# Patient Record
(demographics unavailable — no encounter records)

---

## 2024-11-22 NOTE — ASSESSMENT
[FreeTextEntry1] : Discussed PSA results with patient- stable. Repeat PSA in 7 months. Next follow-up appointment in 7 months. All questions answered patient agreeable with plan. 20-minute discussion for prostate cancer follow-up and review of labs.

## 2024-11-22 NOTE — ADDENDUM
[FreeTextEntry1] : I, Rosa Carrillo assisted in documentation on 11/20/2024 at acting as a scribe for and in the presence of Dr. Cortes Cárdenas.

## 2024-11-22 NOTE — HISTORY OF PRESENT ILLNESS
[FreeTextEntry1] : 80 yo male presents today for a follow-up appointment. He is on active surveillance for Luis 6 prostate cancer.   TP biopsy fusion on (3.8.23) showed: Targeted Lesion #1: GS6 (3+3) involving approx. 50% of the cores  Recent PSA: 3.41 (11.13.24) Free PSA: 26% 3.63 on (4.2.24) 3.03 on (9.26.23) 10.60 on (1.10.23)  Most recent prostate MRI (4.23.24) showed  Lesion #1: PIRADS 5 PV: 65cc PSAD: 0.06  Patient is doing well overall. Denies any urinary complaints at this time.

## 2024-11-22 NOTE — HISTORY OF PRESENT ILLNESS
[FreeTextEntry1] : 78 yo male presents today for a follow-up appointment. He is on active surveillance for Luis 6 prostate cancer.   TP biopsy fusion on (3.8.23) showed: Targeted Lesion #1: GS6 (3+3) involving approx. 50% of the cores  Recent PSA: 3.41 (11.13.24) Free PSA: 26% 3.63 on (4.2.24) 3.03 on (9.26.23) 10.60 on (1.10.23)  Most recent prostate MRI (4.23.24) showed  Lesion #1: PIRADS 5 PV: 65cc PSAD: 0.06  Patient is doing well overall. Denies any urinary complaints at this time.

## 2024-12-09 NOTE — HEALTH RISK ASSESSMENT
[Very Good] : ~his/her~ current health as very good [Excellent] : ~his/her~  mood as  excellent [Yes] : Yes [2 - 4 times a month (2 pts)] : 2-4 times a month (2 points) [1 or 2 (0 pts)] : 1 or 2 (0 points) [Never (0 pts)] : Never (0 points) [No] : In the past 12 months have you used drugs other than those required for medical reasons? No [No falls in past year] : Patient reported no falls in the past year [0] : 2) Feeling down, depressed, or hopeless: Not at all (0) [PHQ-2 Negative - No further assessment needed] : PHQ-2 Negative - No further assessment needed [Never] : Never [None] : None [# of Members in Household ___] :  household currently consist of [unfilled] member(s) [Retired] : retired [Graduate School] : graduate school [] :  [# Of Children ___] : has [unfilled] children [Feels Safe at Home] : Feels safe at home [Fully functional (bathing, dressing, toileting, transferring, walking, feeding)] : Fully functional (bathing, dressing, toileting, transferring, walking, feeding) [Fully functional (using the telephone, shopping, preparing meals, housekeeping, doing laundry, using] : Fully functional and needs no help or supervision to perform IADLs (using the telephone, shopping, preparing meals, housekeeping, doing laundry, using transportation, managing medications and managing finances) [Smoke Detector] : smoke detector [Carbon Monoxide Detector] : carbon monoxide detector [Seat Belt] :  uses seat belt [Reviewed no changes] : Reviewed, no changes [Discussed at today's visit] : Advance Directives Discussed at today's visit [Designated Healthcare Proxy] : Designated healthcare proxy [Name: ___] : Health Care Proxy's Name: [unfilled]  [NO] : No [Audit-CScore] : 2 [de-identified] : active [de-identified] : good [[id number]] : 0 [Change in mental status noted] : No change in mental status noted [Sexually Active] : not sexually active [Reports changes in hearing] : Reports no changes in hearing [Reports changes in vision] : Reports no changes in vision [Reports changes in dental health] : Reports no changes in dental health [Sunscreen] : does not use sunscreen [HepatitisCDate] : 12/13 [HepatitisCComments] : Negative [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

## 2024-12-09 NOTE — COUNSELING
[None] : None [Good understanding] : Patient has a good understanding of lifestyle changes and the steps needed to achieve self management goals [de-identified] : Continue diet and exercise\par  \par  \par

## 2024-12-09 NOTE — PHYSICAL EXAM
[General Appearance - Alert] : alert [General Appearance - In No Acute Distress] : in no acute distress [Sclera] : the sclera and conjunctiva were normal [PERRL With Normal Accommodation] : pupils were equal in size, round, and reactive to light [Extraocular Movements] : extraocular movements were intact [Outer Ear] : the ears and nose were normal in appearance [Oropharynx] : the oropharynx was normal [Neck Appearance] : the appearance of the neck was normal [Neck Cervical Mass (___cm)] : no neck mass was observed [Jugular Venous Distention Increased] : there was no jugular-venous distention [Thyroid Diffuse Enlargement] : the thyroid was not enlarged [Thyroid Nodule] : there were no palpable thyroid nodules [Auscultation Breath Sounds / Voice Sounds] : lungs were clear to auscultation bilaterally [Heart Rate And Rhythm] : heart rate was normal and rhythm regular [Heart Sounds] : normal S1 and S2 [Heart Sounds Gallop] : no gallops [Heart Sounds Pericardial Friction Rub] : no pericardial rub [Arterial Pulses Carotid] : carotid pulses were normal with no bruits [Abdominal Aorta] : the abdominal aorta was normal [Arterial Pulses Femoral] : femoral pulses were normal without bruits [Full Pulse] : the pedal pulses are present [Edema] : there was no peripheral edema [Bowel Sounds] : normal bowel sounds [Abdomen Soft] : soft [Abdomen Tenderness] : non-tender [Abdomen Mass (___ Cm)] : no abdominal mass palpated [Normal Sphincter Tone] : normal sphincter tone [Prostate Enlarged] : was enlarged [Cervical Lymph Nodes Enlarged Anterior Bilaterally] : anterior cervical [Cervical Lymph Nodes Enlarged Posterior Bilaterally] : posterior cervical [Supraclavicular Lymph Nodes Enlarged Bilaterally] : supraclavicular [Axillary Lymph Nodes Enlarged Bilaterally] : axillary [Femoral Lymph Nodes Enlarged Bilaterally] : femoral [Inguinal Lymph Nodes Enlarged Bilaterally] : inguinal [No CVA Tenderness] : no ~M costovertebral angle tenderness [No Spinal Tenderness] : no spinal tenderness [Abnormal Walk] : normal gait [Nail Clubbing] : no clubbing  or cyanosis of the fingernails [Musculoskeletal - Swelling] : no joint swelling seen [Motor Tone] : muscle strength and tone were normal [Skin Color & Pigmentation] : normal skin color and pigmentation [Skin Turgor] : normal skin turgor [] : no rash [No Focal Deficits] : no focal deficits [Oriented To Time, Place, And Person] : oriented to person, place, and time [Impaired Insight] : insight and judgment were intact [Affect] : the affect was normal [Prostate Nodule] : did not have a nodule [Prostate Tenderness] : was not tender [Prostate Fluctuant] : was not fluctuant [FreeTextEntry1] : Chronic stasis changes right leg

## 2024-12-09 NOTE — HISTORY OF PRESENT ILLNESS
[FreeTextEntry1] : 79-year-old male with history of asthma for which he takes Advair, hypertension on losartan HCT and chronic GERD on pantoprazole and prn famotidine  presents for his yearly physical.  Most significant new history is that the patient has had long history of enlarged prostate with lower urinary tract symptoms but late 2022 he had increasing PSA therefore saw urology with biopsy March 2023 with biopsy positive for adenocarcinoma [place] 6.  He is following with urology with planned active surveillance.  Most recent PSA November 2024 = 3.4 with previous increased to 10.  The patient is generally feeling well without complaints including no chest pain, palpitations, shortness of breath or edema.  With using his Advair his asthma is controlled and never uses his rescue inhaler. He follows with the asthma specialist.  The patient had a cardiac murmur therefore sent to cardiology who did an echocardiogram December 2020 showing mild MR/AS. He followed up with cardiology February 2021 with all being stable. Most recent echocardiogram March 2022 showing normal LVEF with aortic sclerosis without AS/AI.  Chronic GERD controlled with pantoprazole and patient become symptomatic when off. Recent evaluation with gastroenterology recommends continuing present treatment. Uses p.r.n. famotidine which is infrequent No follow-up endoscopy per GI  Patient with bone density followup August 2018 with worsening osteoporosis of the hip.Patient saw rheumatology with recommended treatment therefore is on alendronate weekly without side effects including no GERD. this was discontinued early 2021 with bone density August 2020 showing improvement with osteopenia.  -May 2017 the patient had cataract surgery with excellent results.  Patient's blood work showing hyperglycemia with mildly increased hemoglobin A1c therefore patient has significantly changed his diet and eliminating carbohydrates. 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

## 2024-12-09 NOTE — HEALTH RISK ASSESSMENT
[Very Good] : ~his/her~ current health as very good [Excellent] : ~his/her~  mood as  excellent [Yes] : Yes [2 - 4 times a month (2 pts)] : 2-4 times a month (2 points) [1 or 2 (0 pts)] : 1 or 2 (0 points) [Never (0 pts)] : Never (0 points) [No] : In the past 12 months have you used drugs other than those required for medical reasons? No [No falls in past year] : Patient reported no falls in the past year [0] : 2) Feeling down, depressed, or hopeless: Not at all (0) [PHQ-2 Negative - No further assessment needed] : PHQ-2 Negative - No further assessment needed [Never] : Never [None] : None [# of Members in Household ___] :  household currently consist of [unfilled] member(s) [Retired] : retired [Graduate School] : graduate school [] :  [# Of Children ___] : has [unfilled] children [Feels Safe at Home] : Feels safe at home [Fully functional (bathing, dressing, toileting, transferring, walking, feeding)] : Fully functional (bathing, dressing, toileting, transferring, walking, feeding) [Fully functional (using the telephone, shopping, preparing meals, housekeeping, doing laundry, using] : Fully functional and needs no help or supervision to perform IADLs (using the telephone, shopping, preparing meals, housekeeping, doing laundry, using transportation, managing medications and managing finances) [Smoke Detector] : smoke detector [Carbon Monoxide Detector] : carbon monoxide detector [Seat Belt] :  uses seat belt [Reviewed no changes] : Reviewed, no changes [Discussed at today's visit] : Advance Directives Discussed at today's visit [Designated Healthcare Proxy] : Designated healthcare proxy [Name: ___] : Health Care Proxy's Name: [unfilled]  [NO] : No [Audit-CScore] : 2 [de-identified] : active [de-identified] : good [[id number]] : 0 [Change in mental status noted] : No change in mental status noted [Sexually Active] : not sexually active [Reports changes in hearing] : Reports no changes in hearing [Reports changes in vision] : Reports no changes in vision [Reports changes in dental health] : Reports no changes in dental health [Sunscreen] : does not use sunscreen [HepatitisCDate] : 12/13 [HepatitisCComments] : Negative [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

## 2024-12-09 NOTE — HISTORY OF PRESENT ILLNESS
[FreeTextEntry1] : 79-year-old male with history of asthma for which he takes Advair, hypertension on losartan HCT and chronic GERD on pantoprazole and prn famotidine  presents for his yearly physical.  Most significant new history is that the patient has had long history of enlarged prostate with lower urinary tract symptoms but late 2022 he had increasing PSA therefore saw urology with biopsy March 2023 with biopsy positive for adenocarcinoma [place] 6.  He is following with urology with planned active surveillance.  Most recent PSA November 2024 = 3.4 with previous increased to 10.  The patient is generally feeling well without complaints including no chest pain, palpitations, shortness of breath or edema.  With using his Advair his asthma is controlled and never uses his rescue inhaler. He follows with the asthma specialist.  The patient had a cardiac murmur therefore sent to cardiology who did an echocardiogram December 2020 showing mild MR/AS. He followed up with cardiology February 2021 with all being stable. Most recent echocardiogram March 2022 showing normal LVEF with aortic sclerosis without AS/AI.  Chronic GERD controlled with pantoprazole and patient become symptomatic when off. Recent evaluation with gastroenterology recommends continuing present treatment. Uses p.r.n. famotidine which is infrequent No follow-up endoscopy per GI  Patient with bone density followup August 2018 with worsening osteoporosis of the hip.Patient saw rheumatology with recommended treatment therefore is on alendronate weekly without side effects including no GERD. this was discontinued early 2021 with bone density August 2020 showing improvement with osteopenia.  -May 2017 the patient had cataract surgery with excellent results.  Patient's blood work showing hyperglycemia with mildly increased hemoglobin A1c therefore patient has significantly changed his diet and eliminating carbohydrates. 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

## 2024-12-09 NOTE — COUNSELING
[None] : None [Good understanding] : Patient has a good understanding of lifestyle changes and the steps needed to achieve self management goals [de-identified] : Continue diet and exercise\par  \par  \par

## 2024-12-09 NOTE — ASSESSMENT
[FreeTextEntry1] : 79-year-old male currently medically stable with controlled hypertension, asthma and GERD on present medications. Patient is to continue present medications.  New diagnosis of prostate cancer biopsy March 2023 and active surveillance mode with most recent PSA November 2024 at 3.4 which is stable.  Follow-up with urology as scheduled.  Patient with diagnosis of osteoporosis on bone density. Previously on alendronate without side effects. Most recent bone density August 2020 showing improvement to osteopenia therefore now off med. Patient to schedule follow-up with rheumatology Encouraged continued calcium and vitamin D and add weight-bearing and resistance exercises.  Maintain present weight or slight increase would be okay  Colonoscopy August 2021 with no followup colonoscopy needed Endoscopy May 2011 with no followup per GI Bone density January 2023  Vaccines all up-to-date High-dose influenza and COVID vaccines already received  venipuncture done today in the office  Followup in 6 months.

## 2025-02-28 NOTE — PHYSICAL EXAM
[Normal] : no edema, no cyanosis, no clubbing, no varicosities [Normal S1, S2] : normal S1, S2 [No Rub] : no rub [de-identified] : 1-2/6 RENE

## 2025-02-28 NOTE — ASSESSMENT
[FreeTextEntry1] :  Echocardiogram March 3, 2022: LVEF 60 to 65%.  Aortic valve area 1.77 cm.  Mean gradient is 6 mmHg     EKG 2/28/2025- Marked sinus Bradycardia  BORDERLINE RHYTHM  Assessment: 1. Asthma 2. HTN 3.  Mild nonrheumatic aortic stenosis  Recommendations:  1.  Follow-up in 1 year. 2. ECHO

## 2025-02-28 NOTE — HISTORY OF PRESENT ILLNESS
[FreeTextEntry1] : Patient is a 79-year-old  male with a history of asthma, mild aortic stenosis and hypertension n.  Patient has a history of a heart murmur since childhood.    Patient is physically active denies any chest pain, palpitations, syncope.  Patient denies orthopnea PND leg edema.  Patient is not exercising now because of the weather.  He hopes to get back to exercise soon.  No change in medical history since last visit.   Patient has no history of diabetes mellitus.  Patient is a non-smoker.  Patient has no prior history of CAD or CHF.  Patient has no history of TIA or CVA.

## 2025-06-12 NOTE — ASSESSMENT
[FreeTextEntry1] : Venipuncture done in our office, Labs sent out/ recommendations will be made based on lab results. Patient advised to continue present medications with diet/exercise and specialist followup.RTO in dec for CP    vaccines are UTD, + got covid vaccines  Last colonoscopy was 8/2021= no followup needed last endoscopy was may 2011=no follow needed per GI specialists include 1. gastroenterology-Paco Weber 2. ophthalmology-Dr. Sharma 3. allergist-Dr. Mack 4.rheumatology-Dr. Olvera 5.Cardio-Dr. Farley 6.-Dr. Fonseca bone density was 1/2023=rx given Hepatitis C screening in the past was negative echo via cardio

## 2025-06-12 NOTE — ADDENDUM
[FreeTextEntry1] : Labs show - PSA of 4, 2 discuss with urology, faxed - H&H of 12.8/40.4, repeat in 1 month

## 2025-06-12 NOTE — HISTORY OF PRESENT ILLNESS
[FreeTextEntry1] : Patient presents for followup on hypertension/hyperlipidemia/GERD. Patient is currently fasting for today's labs/feels well without complaints. Patient is currently on hyzaar for his hypertension, is trying to control his cholesterol dietarily and is on Protonix daily for his GERD -Asthma controlled -New diagnosis of prostate cancer, in active surveillance mode, needs PSA per

## 2025-06-27 NOTE — ADDENDUM
[FreeTextEntry1] : ICarlita NP, assisted with documentation on 06/18/2025 in the presence and under the direction of Dr. Cortes Cárdenas.

## 2025-06-27 NOTE — HISTORY OF PRESENT ILLNESS
[FreeTextEntry1] : 78 y/o male presents today for a follow-up appointment. He is on active surveillance for Luis 6 prostate cancer.  Prostate biopsy on (3.8.23) showed Targeted Lesion GS6 (3+3) involving approx. 50% of the cores.  Recent PSA: 4.04 on (6.11.25) 3.41 on (11.13.24) 3.63 on (4.2.24) 3.03 on (9.26.23) 10.60 on (1.10.23)  Most recent prostate MRI (4.23.24) showed Lesion #1: PIRADS 5 PV: 65cc PSAD: 0.06  Patient is doing well overall. Denies any urinary complaints at this time.

## 2025-06-27 NOTE — ASSESSMENT
[FreeTextEntry1] : Discussed PSA results with patient- slightly higher but stable at 4.04 ng/mL. On AS for CA prostate Repeat PSA and prostate MRI in 6 months. Next follow-up appointment in 6 months. All questions answered patient agreeable with plan. 20-minute discussion for prostate cancer follow-up and review of labs. Time spent is for reviewing chart, labs and images (if available), counseling and care coordination.

## 2025-07-03 NOTE — ADDENDUM
[FreeTextEntry1] : Labs show - H&H of 12.7/40.7 with decreased ferritin, discuss with GI and see if testing warranted

## 2025-07-03 NOTE — ASSESSMENT
[FreeTextEntry1] : Medrol Dosepak given.  Venipuncture done in our office, Labs sent out/ recommendations will be made based on lab results. Patient advised to continue present medications with diet/exercise and specialist followup.RTO in dec for CP  Dr. Zayas was present in office building while I examined patient  vaccines are UTD, + got covid vaccines  Last colonoscopy was 8/2021= no followup needed last endoscopy was may 2011=no follow needed per GI specialists include 1. gastroenterology-Paco Weber 2. ophthalmology-Dr. Sharma 3. allergist-Dr. Mack 4.rheumatology-Dr. Olvera 5.Cardio-Dr. Farley 6.-Dr. Fonseca bone density was 1/2023="to do" Hepatitis C screening in the past was negative echo via cardio

## 2025-07-03 NOTE — PHYSICAL EXAM
[General Appearance - In No Acute Distress] : in no acute distress [] : no respiratory distress [Respiration, Rhythm And Depth] : normal respiratory rhythm and effort [Auscultation Breath Sounds / Voice Sounds] : lungs were clear to auscultation bilaterally [Heart Rate And Rhythm] : heart rate was normal and rhythm regular [Affect] : the affect was normal [Mood] : the mood was normal [de-identified] : + Contact dermatitis/poison ivy appearing rash right forearm, right trunk

## 2025-07-03 NOTE — PHYSICAL EXAM
[General Appearance - In No Acute Distress] : in no acute distress [] : no respiratory distress [Respiration, Rhythm And Depth] : normal respiratory rhythm and effort [Auscultation Breath Sounds / Voice Sounds] : lungs were clear to auscultation bilaterally [Heart Rate And Rhythm] : heart rate was normal and rhythm regular [Affect] : the affect was normal [Mood] : the mood was normal [de-identified] : + Contact dermatitis/poison ivy appearing rash right forearm, right trunk

## 2025-07-03 NOTE — HISTORY OF PRESENT ILLNESS
[FreeTextEntry1] : Patient presents for followup -Patient is currently on hyzaar for his hypertension - last blood work showed H&H of 12.8/40.4, abnormal results discussed with patient and questions answered - Patient complaining of rash to right forearm, trunk area for 1 week, it is itchy and he has been applying cortisone

## 2025-07-03 NOTE — PHYSICAL EXAM
[General Appearance - In No Acute Distress] : in no acute distress [] : no respiratory distress [Respiration, Rhythm And Depth] : normal respiratory rhythm and effort [Auscultation Breath Sounds / Voice Sounds] : lungs were clear to auscultation bilaterally [Heart Rate And Rhythm] : heart rate was normal and rhythm regular [Affect] : the affect was normal [Mood] : the mood was normal [de-identified] : + Contact dermatitis/poison ivy appearing rash right forearm, right trunk

## 2025-07-15 NOTE — REASON FOR VISIT
[Follow-up] : a follow-up of an existing diagnosis [FreeTextEntry1] : Anemia, hemoglobin 12.7 with low ferritin 27(<30)

## 2025-07-15 NOTE — HISTORY OF PRESENT ILLNESS
[de-identified] : 2015 normal exam.  No pathology. [FreeTextEntry1] : 8/21 negative save internal hemorrhoids.

## 2025-07-15 NOTE — PHYSICAL EXAM
[Alert] : alert [Normal Voice/Communication] : normal voice/communication [Healthy Appearing] : healthy appearing [No Acute Distress] : no acute distress [Sclera] : the sclera and conjunctiva were normal [Hearing Threshold Finger Rub Not [place]] : hearing was normal [Normal Lips/Gums] : the lips and gums were normal [Oropharynx] : the oropharynx was normal [Normal Appearance] : the appearance of the neck was normal [No Neck Mass] : no neck mass was observed [No Respiratory Distress] : no respiratory distress [No Acc Muscle Use] : no accessory muscle use [Respiration, Rhythm And Depth] : normal respiratory rhythm and effort [Auscultation Breath Sounds / Voice Sounds] : lungs were clear to auscultation bilaterally [Heart Rate And Rhythm] : heart rate was normal and rhythm regular [Normal S1, S2] : normal S1 and S2 [Murmurs] : no murmurs [None] : no edema [Bowel Sounds] : normal bowel sounds [Abdomen Tenderness] : non-tender [No Masses] : no abdominal mass palpated [Abdomen Soft] : soft [] : no hepatosplenomegaly [Normal Sphincter Tone] : normal sphincter tone [No External Hemorrhoid] : no external hemorrhoids [No Rectal Mass] : no rectal mass [Occult Blood] : negative occult blood [Oriented To Time, Place, And Person] : oriented to person, place, and time [de-identified] : Small frame and build, thin, no acute distress. [FreeTextEntry1] : Anicteric sclera [de-identified] : Moist mucosa [de-identified] : Supple neck.  No adenopathy. [de-identified] : Clear to A&P [de-identified] : No murmurs rubs or gallops. [de-identified] : Flat soft bowel sounds present.  No organomegaly.  No mass tenderness or rebound.  No distention.  No adenopathy. [de-identified] : No palpable lesions.  No internal hemorrhoids.  Virtually empty vault.  Flecks of stool recovered. [de-identified] : Normal. [de-identified] : Normal. [de-identified] : Normal. [de-identified] : Frail, thin skinned.